# Patient Record
Sex: FEMALE | Race: WHITE | NOT HISPANIC OR LATINO | Employment: FULL TIME
[De-identification: names, ages, dates, MRNs, and addresses within clinical notes are randomized per-mention and may not be internally consistent; named-entity substitution may affect disease eponyms.]

---

## 2017-11-12 ENCOUNTER — HEALTH MAINTENANCE LETTER (OUTPATIENT)
Age: 47
End: 2017-11-12

## 2019-05-13 ENCOUNTER — OFFICE VISIT (OUTPATIENT)
Dept: URGENT CARE | Facility: RETAIL CLINIC | Age: 49
End: 2019-05-13
Payer: COMMERCIAL

## 2019-05-13 VITALS — DIASTOLIC BLOOD PRESSURE: 85 MMHG | OXYGEN SATURATION: 100 % | HEART RATE: 84 BPM | SYSTOLIC BLOOD PRESSURE: 121 MMHG

## 2019-05-13 DIAGNOSIS — H00.022 HORDEOLUM INTERNUM OF RIGHT LOWER EYELID: Primary | ICD-10-CM

## 2019-05-13 PROCEDURE — 99213 OFFICE O/P EST LOW 20 MIN: CPT | Performed by: INTERNAL MEDICINE

## 2019-05-13 RX ORDER — GABAPENTIN 300 MG/1
CAPSULE ORAL
Refills: 3 | COMMUNITY
Start: 2019-02-01

## 2019-05-13 RX ORDER — BACITRACIN 500 [USP'U]/G
OINTMENT OPHTHALMIC
Qty: 3.5 G | Refills: 0 | Status: SHIPPED | OUTPATIENT
Start: 2019-05-13

## 2019-05-13 RX ORDER — VENLAFAXINE HYDROCHLORIDE 75 MG/1
CAPSULE, EXTENDED RELEASE ORAL
Refills: 0 | COMMUNITY
Start: 2019-03-05

## 2019-05-13 RX ORDER — ESCITALOPRAM OXALATE 20 MG/1
20 TABLET ORAL DAILY
Refills: 2 | COMMUNITY
Start: 2018-07-11

## 2019-05-13 RX ORDER — MONTELUKAST SODIUM 10 MG/1
TABLET ORAL
Refills: 1 | COMMUNITY
Start: 2018-09-19

## 2019-05-13 RX ORDER — PHENTERMINE AND TOPIRAMATE 7.5; 46 MG/1; MG/1
CAPSULE, EXTENDED RELEASE ORAL
Refills: 5 | COMMUNITY
Start: 2019-04-30

## 2019-05-13 RX ORDER — BACILLUS COAGULANS 1B CELL
1 CAPSULE ORAL DAILY
Refills: 6 | COMMUNITY
Start: 2018-06-12

## 2019-05-13 NOTE — PROGRESS NOTES
"[facility] express Care Progress Note        Leslie Herrera MD, MPH  05/13/2019        History:      Edna Mena is a pleasant 48 year old year old female with stye of the right eye ( \"bump in the right eye lower lid\") for  4 Day (s). No change in visual accuity. Patient usually wears contact lenses but has not been wearing them since 4 days ago. No fever or illness. No cough or shortness of breath. No headache or neck pain. No trauma to the eye. No photophobia. No nasal drainage.          Assessment and Plan:         Hordeolum internum of right lower eyelid  - bacitracin 500 UNIT/GM ophthalmic ointment; Apply to right eye lid ( lower lid) 3 times per day for 7 days.  Dispense: 3.5 g; Refill: 0    Advised to wash hands meticulously before and after caring for the eye.  Please do not wear the contact lenses until 4-5 days after resolution of current symptoms.  F/u w an eye doctor ( ophthalmologist) in 2-3 days, earlier if symptoms worsen.                   Physical Exam:      /85   Pulse 84   SpO2 100%      Constitutional: Patient is in no distress The patient is pleasant and cooperative.   HEENT: Head:  Head is atraumatic, normocephalic.    Eyes: Pupils are equal, round and reactive to light and accomodation.  Sclera is non-icteric.  Focal erythematous and slightly elevated lesion from the right lower lid conjunctival surface( midpoint) w/o abscess currently. No conjunctival exudate is noted.Extraocular motion is intact. Visual acuity is intact bilaterally.  Ears:  External acoustic canals are patent and clear.  There is no erythema and bulging( exudate)  of the ( R/L ) tympanic membrane(s ).   Nose:  No Nasal congestion w/o drainage or mucosal ulceration is noted.  Throat:  Oral mucosa is moist.  No oral lesions are noted.  No posterior pharyngeal hyperemia nor exudate noted.     Neck Supple.  There is no cervical lymphadenopathy.  No nuchal rigidity noted.  There is no meningismus.   "   Cardiovascular: Heart is regular to rate and rhythm.  No murmur is noted.     Lungs: Clear in the anterior and posterior pulmonary fields.   Abdomen: Soft and non-tender.    Back No flank tenderness is noted.   Extremeties No edema, no calf tenderness.   Neuro: No focal deficit.   Skin No petechiae or purpura is noted.  There is no rash.   Mood Normal              Data:      All new lab and imaging data was reviewed.   No results found for this or any previous visit.

## 2021-09-28 ENCOUNTER — HOSPITAL ENCOUNTER (EMERGENCY)
Facility: CLINIC | Age: 51
Discharge: HOME | End: 2021-09-28
Attending: FAMILY MEDICINE | Admitting: FAMILY MEDICINE
Payer: COMMERCIAL

## 2021-09-28 VITALS
DIASTOLIC BLOOD PRESSURE: 95 MMHG | HEART RATE: 89 BPM | SYSTOLIC BLOOD PRESSURE: 134 MMHG | HEIGHT: 65 IN | BODY MASS INDEX: 32.82 KG/M2 | OXYGEN SATURATION: 97 % | WEIGHT: 197 LBS | TEMPERATURE: 98.3 F | RESPIRATION RATE: 18 BRPM

## 2021-09-28 DIAGNOSIS — H02.846 SWELLING OF LEFT EYELID: ICD-10-CM

## 2021-09-28 DIAGNOSIS — T63.441A LOCAL REACTION TO BEE STING, ACCIDENTAL OR UNINTENTIONAL, INITIAL ENCOUNTER: ICD-10-CM

## 2021-09-28 PROCEDURE — 99284 EMERGENCY DEPT VISIT MOD MDM: CPT | Performed by: FAMILY MEDICINE

## 2021-09-28 PROCEDURE — 250N000013 HC RX MED GY IP 250 OP 250 PS 637: Performed by: FAMILY MEDICINE

## 2021-09-28 RX ORDER — CETIRIZINE HYDROCHLORIDE 10 MG/1
10 TABLET ORAL ONCE
Status: COMPLETED | OUTPATIENT
Start: 2021-09-28 | End: 2021-09-28

## 2021-09-28 RX ORDER — FAMOTIDINE 20 MG/1
20 TABLET, FILM COATED ORAL 2 TIMES DAILY
Qty: 20 TABLET | Refills: 0 | Status: SHIPPED | OUTPATIENT
Start: 2021-09-28 | End: 2021-10-08

## 2021-09-28 RX ORDER — CETIRIZINE HYDROCHLORIDE 10 MG/1
TABLET ORAL
Qty: 20 TABLET | Refills: 1 | Status: SHIPPED | OUTPATIENT
Start: 2021-09-28

## 2021-09-28 RX ORDER — FAMOTIDINE 20 MG/1
40 TABLET, FILM COATED ORAL ONCE
Status: COMPLETED | OUTPATIENT
Start: 2021-09-28 | End: 2021-09-28

## 2021-09-28 RX ADMIN — FAMOTIDINE 40 MG: 20 TABLET, FILM COATED ORAL at 21:42

## 2021-09-28 RX ADMIN — CETIRIZINE HYDROCHLORIDE 10 MG: 10 TABLET, FILM COATED ORAL at 21:42

## 2021-09-28 ASSESSMENT — MIFFLIN-ST. JEOR: SCORE: 1519.47

## 2021-09-29 ASSESSMENT — ENCOUNTER SYMPTOMS
FACIAL SWELLING: 1
CARDIOVASCULAR NEGATIVE: 1
TROUBLE SWALLOWING: 0
COUGH: 0
SORE THROAT: 0
CHEST TIGHTNESS: 0
CONSTITUTIONAL NEGATIVE: 1
SHORTNESS OF BREATH: 0
FEVER: 0

## 2021-09-29 ASSESSMENT — VISUAL ACUITY: OU: 1

## 2021-09-29 NOTE — ED TRIAGE NOTES
States was stung by a wasp around 1730 tonight to the left forehead and then shortly later developed swelling to the left eye lid. Swelling is blocking her line of vision. Took ibuprofen, iced the eyelid.  Does not have any respiratory distress, or oral swelling

## 2021-09-29 NOTE — ED PROVIDER NOTES
History     Chief Complaint   Patient presents with     Insect Bite     HPI  Edna Ayoub is a 50 year old female who presented emergency room today secondary to concerns of swelling to her left upper eyelid.  Patient states that she was stung by a wasp type insect at about 530 this evening, about 3 hours ago.  She states that she put ice to the area immediately.  About 2 hours after the bite she developed increased pain over the upper eyelid area and then sudden swelling to the eyelid itself.  She denies vision change.  She states that she is up-to-date on her tetanus immunization.  She denies any other swelling or itching to her skin.  She denies any sore throat or difficulty swallowing or breathing.    Allergies:  Allergies   Allergen Reactions     Codeine Nausea and Vomiting and Swelling       Problem List:    There are no problems to display for this patient.       Past Medical History:    No past medical history on file.    Past Surgical History:    No past surgical history on file.    Family History:    No family history on file.    Social History:  Marital Status:  Single [1]  Social History     Tobacco Use     Smoking status: Not on file   Substance Use Topics     Alcohol use: Not on file     Drug use: Not on file        Medications:    cetirizine (ZYRTEC) 10 MG tablet  famotidine (PEPCID) 20 MG tablet          Review of Systems   Constitutional: Negative.  Negative for fever.   HENT: Positive for facial swelling (Left upper eyelid.). Negative for sore throat and trouble swallowing.    Eyes: Positive for visual disturbance (She states is hard to see out of her left eye due to swelling to the upper eyelid.).   Respiratory: Negative for cough, chest tightness and shortness of breath.    Cardiovascular: Negative.    Skin:        Left upper eyelid swelling after being bit by a wasp or hornet to the left forehead region several hours earlier.   All other systems reviewed and are negative.      Physical Exam  "  BP: (!) 134/95  Pulse: 89  Temp: 98.3  F (36.8  C)  Resp: 18  Height: 165.1 cm (5' 5\")  Weight: 89.4 kg (197 lb)  SpO2: 97 %      Physical Exam  Vitals and nursing note reviewed.   Constitutional:       General: She is not in acute distress.  HENT:      Head: Normocephalic and atraumatic.      Nose: Nose normal.      Mouth/Throat:      Mouth: Mucous membranes are moist.      Pharynx: Oropharynx is clear. No posterior oropharyngeal erythema.   Eyes:      General: Vision grossly intact. Gaze aligned appropriately.      Extraocular Movements: Extraocular movements intact.     Cardiovascular:      Rate and Rhythm: Normal rate.      Pulses: Normal pulses.   Pulmonary:      Effort: Pulmonary effort is normal. No respiratory distress.   Musculoskeletal:         General: Normal range of motion.      Cervical back: Normal range of motion and neck supple.   Skin:     Capillary Refill: Capillary refill takes less than 2 seconds.   Neurological:      General: No focal deficit present.      Mental Status: She is alert and oriented to person, place, and time.   Psychiatric:         Mood and Affect: Mood normal.         Behavior: Behavior normal.         ED Course        Procedures              Critical Care time:  none               Medications   cetirizine (zyrTEC) tablet 10 mg (10 mg Oral Given 9/28/21 2142)   famotidine (PEPCID) tablet 40 mg (40 mg Oral Given 9/28/21 2142)       Assessments & Plan (with Medical Decision Making)  50-year-old female to the ER secondary concerns of a swelling to her left upper eyelid after being bit by a wasp or hornet to the left forehead earlier today.  Exam findings consistent with localized area reaction to wasp or hornet sting.  Left eyelid is swollen but there is no signs of infection or inflammation.  The eye itself appears to be without abnormality.  Patient is up-to-date on her tetanus immunization.  Patient treated with oral Zyrtec and Pepcid.  No worsening of the swelling occurred " during a period of observation in the ER.  Patient eventually discharged home with prescriptions for additional medications to help with the swelling.  Encouraged ice therapy as needed as well.  To return the ER for increase or worsening symptoms as directed on the handout.     I have reviewed the nursing notes.    I have reviewed the findings, diagnosis, plan and need for follow up with the patient.       Discharge Medication List as of 9/28/2021 10:27 PM      START taking these medications    Details   cetirizine (ZYRTEC) 10 MG tablet 1 tab up to twice a day for itch/rash, Disp-20 tablet, R-1, E-Prescribe      famotidine (PEPCID) 20 MG tablet Take 1 tablet (20 mg) by mouth 2 times daily for 10 days, Disp-20 tablet, R-0, E-Prescribe                  I verbally discussed the findings of the evaluation today in the ER. I have verbally discussed with Edna the suggested treatment(s) as described in the discharge instructions and handouts. I have prescribed the above listed medications and instructed her on appropriate use of these medications.      I have verbally suggested she follow-up in her clinic or return to the ER for increased symptoms. See the follow-up recommendations documented  in the after visit summary in this visit's EPIC chart.    Final diagnoses:   Local reaction to bee sting, accidental or unintentional, initial encounter   Swelling of left eyelid       9/28/2021   [facility] EMERGENCY DEPT     Maddi, Cameron Dutton, DO  09/29/21 0254